# Patient Record
Sex: MALE | Race: WHITE | Employment: FULL TIME | URBAN - METROPOLITAN AREA
[De-identification: names, ages, dates, MRNs, and addresses within clinical notes are randomized per-mention and may not be internally consistent; named-entity substitution may affect disease eponyms.]

---

## 2024-08-06 ENCOUNTER — HOSPITAL ENCOUNTER (OUTPATIENT)
Dept: PULMONOLOGY | Age: 34
Discharge: HOME | End: 2024-08-06
Payer: COMMERCIAL

## 2024-08-06 VITALS — OXYGEN SATURATION: 96 %

## 2024-08-06 DIAGNOSIS — Z87.898 HX OF WHEEZING: ICD-10-CM

## 2024-08-06 PROCEDURE — 6370000000 HC RX 637 (ALT 250 FOR IP): Performed by: PHYSICIAN ASSISTANT

## 2024-08-06 PROCEDURE — 94760 N-INVAS EAR/PLS OXIMETRY 1: CPT

## 2024-08-06 PROCEDURE — 94010 BREATHING CAPACITY TEST: CPT

## 2024-08-06 PROCEDURE — 94060 EVALUATION OF WHEEZING: CPT

## 2024-08-06 RX ORDER — ALBUTEROL SULFATE 90 UG/1
4 AEROSOL, METERED RESPIRATORY (INHALATION) ONCE
Status: COMPLETED | OUTPATIENT
Start: 2024-08-06 | End: 2024-08-06

## 2024-08-06 RX ADMIN — Medication 4 PUFF: at 08:24

## 2024-08-07 PROCEDURE — 94010 BREATHING CAPACITY TEST: CPT | Performed by: STUDENT IN AN ORGANIZED HEALTH CARE EDUCATION/TRAINING PROGRAM

## 2024-08-07 NOTE — PROCEDURES
Pulmonary Function Testing      Patient name:  Arnoldo Rodrigues      Unit #:   [id number]   Date of test:  8/6/2024   Date of interpretation:   8/7/2024    Mr. Arnoldo Rodrigues is a 34 y.o. year-old former smoker. The spirometry data were acceptable and reproducible.     Spirometry:  Flow volume loops were normal. The FEV-1/FVC ratio was normal. The FEV-1 was mild. The FVC was normal. Response to inhaled bronchodilators (albuterol) was significant.    Lung volumes:  Lung volumes were not tested by plethysmography. The total lung capacity was not tested. The residual volume was not tested. The ratio of residual volume to total lung capacity (RV/TLC) was not tested.     Diffusion capacity was not tested.    Interpretation:  Nonspecific ventilatory defect with significant bronchodilator response.  Lung volumes and diffusion were not assessed.  Recommend full pulmonary function test.    Comments: None    Delgado Mane MD, [facility]  [facility]